# Patient Record
Sex: MALE | Race: WHITE | NOT HISPANIC OR LATINO | URBAN - METROPOLITAN AREA
[De-identification: names, ages, dates, MRNs, and addresses within clinical notes are randomized per-mention and may not be internally consistent; named-entity substitution may affect disease eponyms.]

---

## 2023-03-24 PROBLEM — M76.899 HIP FLEXOR TENDINITIS: Status: ACTIVE | Noted: 2023-03-24

## 2023-03-24 PROBLEM — K50.90 CROHN'S DISEASE (MULTI): Status: ACTIVE | Noted: 2023-03-24

## 2023-04-06 PROBLEM — J03.90 TONSILLITIS: Status: ACTIVE | Noted: 2023-04-06

## 2023-04-06 PROBLEM — J02.9 SORE THROAT: Status: ACTIVE | Noted: 2023-04-06

## 2023-04-07 ENCOUNTER — OFFICE VISIT (OUTPATIENT)
Dept: PRIMARY CARE | Facility: CLINIC | Age: 43
End: 2023-04-07
Payer: COMMERCIAL

## 2023-04-07 ENCOUNTER — LAB (OUTPATIENT)
Dept: LAB | Facility: LAB | Age: 43
End: 2023-04-07
Payer: COMMERCIAL

## 2023-04-07 VITALS
HEART RATE: 58 BPM | BODY MASS INDEX: 23.4 KG/M2 | DIASTOLIC BLOOD PRESSURE: 64 MMHG | WEIGHT: 172.8 LBS | SYSTOLIC BLOOD PRESSURE: 92 MMHG | OXYGEN SATURATION: 100 % | HEIGHT: 72 IN

## 2023-04-07 DIAGNOSIS — Z13.6 ENCOUNTER FOR SCREENING FOR CARDIOVASCULAR DISORDERS: ICD-10-CM

## 2023-04-07 DIAGNOSIS — Z13.0 SCREENING FOR DEFICIENCY ANEMIA: ICD-10-CM

## 2023-04-07 DIAGNOSIS — Z00.00 ROUTINE GENERAL MEDICAL EXAMINATION AT A HEALTH CARE FACILITY: ICD-10-CM

## 2023-04-07 DIAGNOSIS — Z00.00 ROUTINE GENERAL MEDICAL EXAMINATION AT A HEALTH CARE FACILITY: Primary | ICD-10-CM

## 2023-04-07 LAB
ALANINE AMINOTRANSFERASE (SGPT) (U/L) IN SER/PLAS: 26 U/L (ref 10–52)
ALBUMIN (G/DL) IN SER/PLAS: 4.3 G/DL (ref 3.4–5)
ALKALINE PHOSPHATASE (U/L) IN SER/PLAS: 64 U/L (ref 33–120)
ANION GAP IN SER/PLAS: 12 MMOL/L (ref 10–20)
ASPARTATE AMINOTRANSFERASE (SGOT) (U/L) IN SER/PLAS: 22 U/L (ref 9–39)
BILIRUBIN TOTAL (MG/DL) IN SER/PLAS: 0.5 MG/DL (ref 0–1.2)
CALCIUM (MG/DL) IN SER/PLAS: 9.8 MG/DL (ref 8.6–10.6)
CARBON DIOXIDE, TOTAL (MMOL/L) IN SER/PLAS: 32 MMOL/L (ref 21–32)
CHLORIDE (MMOL/L) IN SER/PLAS: 101 MMOL/L (ref 98–107)
CHOLESTEROL (MG/DL) IN SER/PLAS: 143 MG/DL (ref 0–199)
CHOLESTEROL IN HDL (MG/DL) IN SER/PLAS: 48.1 MG/DL
CHOLESTEROL/HDL RATIO: 3
CREATININE (MG/DL) IN SER/PLAS: 1.05 MG/DL (ref 0.5–1.3)
ERYTHROCYTE DISTRIBUTION WIDTH (RATIO) BY AUTOMATED COUNT: 12.2 % (ref 11.5–14.5)
ERYTHROCYTE MEAN CORPUSCULAR HEMOGLOBIN CONCENTRATION (G/DL) BY AUTOMATED: 32.7 G/DL (ref 32–36)
ERYTHROCYTE MEAN CORPUSCULAR VOLUME (FL) BY AUTOMATED COUNT: 95 FL (ref 80–100)
ERYTHROCYTES (10*6/UL) IN BLOOD BY AUTOMATED COUNT: 4.72 X10E12/L (ref 4.5–5.9)
GFR MALE: 90 ML/MIN/1.73M2
GLUCOSE (MG/DL) IN SER/PLAS: 85 MG/DL (ref 74–99)
HEMATOCRIT (%) IN BLOOD BY AUTOMATED COUNT: 44.7 % (ref 41–52)
HEMOGLOBIN (G/DL) IN BLOOD: 14.6 G/DL (ref 13.5–17.5)
LDL: 82 MG/DL (ref 0–99)
LEUKOCYTES (10*3/UL) IN BLOOD BY AUTOMATED COUNT: 4.2 X10E9/L (ref 4.4–11.3)
NRBC (PER 100 WBCS) BY AUTOMATED COUNT: 0 /100 WBC (ref 0–0)
PLATELETS (10*3/UL) IN BLOOD AUTOMATED COUNT: 271 X10E9/L (ref 150–450)
POTASSIUM (MMOL/L) IN SER/PLAS: 4.1 MMOL/L (ref 3.5–5.3)
PROTEIN TOTAL: 6.8 G/DL (ref 6.4–8.2)
SODIUM (MMOL/L) IN SER/PLAS: 141 MMOL/L (ref 136–145)
TRIGLYCERIDE (MG/DL) IN SER/PLAS: 66 MG/DL (ref 0–149)
UREA NITROGEN (MG/DL) IN SER/PLAS: 15 MG/DL (ref 6–23)
VLDL: 13 MG/DL (ref 0–40)

## 2023-04-07 PROCEDURE — 36415 COLL VENOUS BLD VENIPUNCTURE: CPT

## 2023-04-07 PROCEDURE — 99396 PREV VISIT EST AGE 40-64: CPT | Performed by: STUDENT IN AN ORGANIZED HEALTH CARE EDUCATION/TRAINING PROGRAM

## 2023-04-07 PROCEDURE — 85027 COMPLETE CBC AUTOMATED: CPT

## 2023-04-07 PROCEDURE — 80053 COMPREHEN METABOLIC PANEL: CPT

## 2023-04-07 PROCEDURE — 80061 LIPID PANEL: CPT

## 2023-04-07 RX ORDER — HYDROCORTISONE 25 MG/G
CREAM TOPICAL 2 TIMES DAILY
COMMUNITY
Start: 2020-07-29

## 2023-04-07 RX ORDER — TRIAMCINOLONE ACETONIDE 1 MG/G
OINTMENT TOPICAL 2 TIMES DAILY
COMMUNITY
Start: 2020-07-29 | End: 2023-04-07 | Stop reason: ALTCHOICE

## 2023-04-07 RX ORDER — ZOLPIDEM TARTRATE 10 MG/1
1 TABLET ORAL DAILY PRN
COMMUNITY
Start: 2015-10-20 | End: 2023-04-07 | Stop reason: ALTCHOICE

## 2023-04-07 RX ORDER — AMOXICILLIN AND CLAVULANATE POTASSIUM 875; 125 MG/1; MG/1
TABLET, FILM COATED ORAL
COMMUNITY
Start: 2023-01-02 | End: 2023-04-07 | Stop reason: ALTCHOICE

## 2023-04-07 RX ORDER — AMOXICILLIN 500 MG/1
500 CAPSULE ORAL 2 TIMES DAILY
Qty: 20 CAPSULE | Refills: 0 | COMMUNITY
Start: 2023-01-15 | End: 2023-04-07 | Stop reason: ALTCHOICE

## 2023-04-07 RX ORDER — KETOCONAZOLE 20 MG/ML
SHAMPOO, SUSPENSION TOPICAL
COMMUNITY
Start: 2020-07-29 | End: 2023-04-07 | Stop reason: ALTCHOICE

## 2023-04-07 NOTE — PROGRESS NOTES
"Subjective   Patient ID: Paul Almanza is a 42 y.o. male who presents for Annual Exam (He is fasting.).    HPI wellness visit.  No major complaints    Review of Systems  Constitutional: NO F, chills, or sweats  Eyes: no blurred vision or visual disturbance  ENT: no hearing loss, no congestion, no nasal discharge, no hoarseness and no sore throat.   Cardiovascular: no chest pain, no edema, no palps and no syncope.   Respiratory: no cough,no s.o.b. and no wheezing  Gastrointestinal: no abdominal pain, No C/D no N/V, no blood in stools  Genitourinary: no dysuria, no change in urinary frequency, no urinary hesitancy and no feelings of urinary urgency.   Musculoskeletal: no arthralgias,  no back pain and no myalgias.   Integumentary: no new skin lesions and no rashes.   Neurological: no difficulty walking, no headache, no limb weakness, no numbness and no tingling.   Psychiatric: no anxiety, no depression, no anhedonia and no substance use disorders.   Endocrine: no recent weight gain and no recent weight loss.   Hematologic/Lymphatic: no tendency for easy bruising and no swollen glands.  Objective   BP 92/64 (BP Location: Left arm, Patient Position: Sitting, BP Cuff Size: Adult)   Pulse 58   Ht 1.822 m (5' 11.73\")   Wt 78.4 kg (172 lb 12.8 oz)   SpO2 100%   BMI 23.61 kg/m²     Physical Exam  gen- a & o x 3, nad, pleasant  heent- eomi, perrla, ear canals patent, TM's non-erythematous, no fluid, frontal and maxillary sinus's nontender  neck- supple, nontender, no palpable or enlarged nodes, no thyromegaly  heart- rrr, no murmurs  lungs- cta b/l , no w/r/r  chest- symmetric, nontender  ab- soft, nontender, no palpable organomegaly, postive bowel sounds  ex's- no c/c/e  neuro- CNs 2-12 grossly intact, full sensation and strength in all extremities  g/u-na  rectal- na  skin- no atypical rashes or lesions  Assessment/Plan     1.  Wellness visit.  No concerns on exam.  Screening labs ordered today.  Continue healthy " diet and fitness.

## 2023-04-07 NOTE — PATIENT INSTRUCTIONS
1.  Wellness visit.  No concerns on exam.  Screening labs ordered today.  Continue healthy diet and fitness.

## 2024-01-19 ENCOUNTER — OFFICE VISIT (OUTPATIENT)
Dept: ORTHOPEDIC SURGERY | Facility: CLINIC | Age: 44
End: 2024-01-19
Payer: COMMERCIAL

## 2024-01-19 DIAGNOSIS — M24.131 DEGENERATIVE TFCC TEAR, RIGHT: Primary | ICD-10-CM

## 2024-01-19 PROCEDURE — 2500000005 HC RX 250 GENERAL PHARMACY W/O HCPCS: Performed by: ORTHOPAEDIC SURGERY

## 2024-01-19 PROCEDURE — 99213 OFFICE O/P EST LOW 20 MIN: CPT | Performed by: ORTHOPAEDIC SURGERY

## 2024-01-19 PROCEDURE — 2500000004 HC RX 250 GENERAL PHARMACY W/ HCPCS (ALT 636 FOR OP/ED): Performed by: ORTHOPAEDIC SURGERY

## 2024-01-19 PROCEDURE — 99203 OFFICE O/P NEW LOW 30 MIN: CPT | Performed by: ORTHOPAEDIC SURGERY

## 2024-01-19 PROCEDURE — 20605 DRAIN/INJ JOINT/BURSA W/O US: CPT | Performed by: ORTHOPAEDIC SURGERY

## 2024-01-19 PROCEDURE — 1036F TOBACCO NON-USER: CPT | Performed by: ORTHOPAEDIC SURGERY

## 2024-01-19 RX ADMIN — LIDOCAINE HYDROCHLORIDE 0.75 ML: 10 INJECTION, SOLUTION INFILTRATION; PERINEURAL at 12:16

## 2024-01-19 RX ADMIN — TRIAMCINOLONE ACETONIDE 30 MG: 40 INJECTION, SUSPENSION INTRA-ARTICULAR; INTRAMUSCULAR at 12:16

## 2024-01-19 NOTE — LETTER
February 10, 2024     Rick Dey DO  [address]  [address]  [facility] [address]    Patient: Paul Almanza   YOB: 1980   Date of Visit: 1/19/2024       Dear Dr. Rick Dey DO:    Thank you for referring Paul Almanza to me for evaluation. Below are my notes for this consultation.  If you have questions, please do not hesitate to call me. I look forward to following your patient along with you.       Sincerely,     Ray Tracy MD      CC: No Recipients  ______________________________________________________________________________________    CHIEF COMPLAINT         Right wrist pain    ASSESSMENT + PLAN    Right wrist degenerative TFCC tear    I reviewed the nature of a degenerative TFCC tear, and discussed the typical self-limited clinical course.  I discussed my recommendation for use of ice or heat and anti-inflammatories or Tylenol as needed for any discomfort.  A wrist splint, such as a carpal tunnel splint, can help reduce the discomfort but will not completely eliminate it.  Only time can do that.    They may advance activity out of the splint as pain allows.  There are no particular restrictions.    The patient felt that they had already maximized the benefit of conservative care, and wanted to proceed with an injection .  I reviewed the risks of cortisone injection, including infection, hypopigmentation, and fat atrophy.  The transient cortisone effect on blood glucose measurement was also reviewed, and the patient wished to proceed.    After sterile prep, I injected 30 mg of Kenalog and 1 cc of 1% lidocaine, plain to the dorsum of the right wrist through the 3-4 interval.    I discussed the small possibility of needing a surgical wrist scope procedure down the road to clean out the wrist.  Most of these will get better on their own.    Follow-up with any concerns.        HISTORY OF PRESENT ILLNESS       Patient is a 43 y.o. right-hand dominant male , who  presents today for evaluation of right wrist pain.  This began December 26 while he was cleaning his house.  There was no single specific recalled direct trauma to that hand.  Pain began gradually.  No numbness or tingling.  Pain is sharp and in the ulnar wrist.  It is worse with flexion or extension and especially with ulnar deviation and twist, such as using a screwdriver.  No similar problem on the left.  No popping, clicking, or instability.  He has tried Tylenol for this.  Ibuprofen helps but he uses it sparingly given his Crohn's disease.  He was using a compression strap and the symptoms improved.  He discontinued the splint and symptoms recurred.  He is now using the strap again but it is not helping as much.  This does not wake him from sleep.    He is not diabetic or hypothyroid.  He does not smoke.      REVIEW OF SYSTEMS       A 30-item multi-system Review Of Systems was obtained on today's intake form.  This was reviewed with the patient and is correct.  The pertinent positives and negatives are listed above.  The form has been scanned separately into the medical record.      PHYSICAL EXAM    Constitutional:    Appears stated age. Well-developed and well-nourished male in no acute distress.  Psychiatric:         Pleasant normal mood and affect. Behavior is appropriate for the situation.   Head:                   Normocephalic and atraumatic.  Eyes:                    Pupils are equal and round.  Cardiovascular:  2+ radial and ulnar pulses. Fingers well-perfused.  Respiratory:        Effort normal. No respiratory distress. Speaking in complete sentences.  Neurologic:       Alert and oriented to person, place, and time.  Skin:                Skin is intact, warm and dry.  Hematologic / Lymphatic:    No lymphedema or lymphangitis.    Extremities / Musculoskeletal:                      Skin of the right hand and wrist is intact with no erythema, ecchymosis, or diffuse swelling.  Normal skin drag and  coloration.  Full composite finger flexion extension with full intrinsic plus minus posture.  Good sagittal plane balance.  Negative Lira.  Negative midcarpal shift.  Negative PT compression and LT shear tests.  DRUJ stable in all stations.  Negative DRUJ grind.  Positive TFCC grind with appropriate reversal.  Wrist flexion just 15 degrees without discomfort in pronation.  Full extension.  Tender in the fovea.  No tenderness at the ECU.  Negative Synergy test.  Tendon stable in the groove by FUSS test.      IMAGING / LABS / EMGs           None today      No past medical history on file.    Medication Documentation Review Audit       Reviewed by Anushka Plata CMA (Medical Assistant) on 04/07/23 at 0829      Medication Order Taking? Sig Documenting Provider Last Dose Status     Discontinued 04/07/23 0828   amoxicillin-pot clavulanate (Augmentin) 875-125 mg tablet [id number] No TAKE 1 TABLET EVERY 12 HOURS UNTIL GONE Historical Provider, MD Not Taking Active   hydrocortisone 2.5 % cream [id number] No Apply topically twice a day. Historical Provider, MD Not Taking Active   ketoconazole (NIZOral) 2 % shampoo [id number] No Apply to face, let sit for a few minutes, then rinse off. Historical Provider, MD Not Taking Active   triamcinolone (Kenalog) 0.1 % ointment [id number] No Apply topically twice a day. Historical Provider, MD Not Taking Active   zolpidem (Ambien) 10 mg tablet [id number] No Take 1 tablet (10 mg) by mouth once daily as needed. Historical Provider, MD Not Taking Active                    No Known Allergies    Social History     Socioeconomic History   • Marital status:      Spouse name: Not on file   • Number of children: Not on file   • Years of education: Not on file   • Highest education level: Not on file   Occupational History   • Not on file   Tobacco Use   • Smoking status: Not on file   • Smokeless tobacco: Not on file   Substance and Sexual Activity   • Alcohol use: Not on file   • Drug use: Not  on file   • Sexual activity: Not on file   Other Topics Concern   • Not on file   Social History Narrative   • Not on file     Social Determinants of Health     Financial Resource Strain: Not on file   Food Insecurity: Not on file   Transportation Needs: Not on file   Physical Activity: Not on file   Stress: Not on file   Social Connections: Not on file   Intimate Partner Violence: Not on file   Housing Stability: Not on file       Past Surgical History:   Procedure Laterality Date   • OTHER SURGICAL HISTORY  09/24/2021    Appendectomy   • OTHER SURGICAL HISTORY  09/24/2021    Small bowel resection       PROCEDURE    M Inj/Asp: R radiocarpal on 1/19/2024 12:16 PM  Details: 25 G needle, dorsal approach  Medications: 30 mg triamcinolone acetonide 40 mg/mL; 0.75 mL lidocaine 10 mg/mL (1 %)  Outcome: tolerated well, no immediate complications  Procedure, treatment alternatives, risks and benefits explained, specific risks discussed. Consent was given by the patient.           Electronically Signed      JOSE Tracy MD      Orthopaedic Hand Surgery      [phone]

## 2024-01-19 NOTE — PROGRESS NOTES
CHIEF COMPLAINT         Right wrist pain    ASSESSMENT + PLAN    Right wrist degenerative TFCC tear    I reviewed the nature of a degenerative TFCC tear, and discussed the typical self-limited clinical course.  I discussed my recommendation for use of ice or heat and anti-inflammatories or Tylenol as needed for any discomfort.  A wrist splint, such as a carpal tunnel splint, can help reduce the discomfort but will not completely eliminate it.  Only time can do that.    They may advance activity out of the splint as pain allows.  There are no particular restrictions.    The patient felt that they had already maximized the benefit of conservative care, and wanted to proceed with an injection .  I reviewed the risks of cortisone injection, including infection, hypopigmentation, and fat atrophy.  The transient cortisone effect on blood glucose measurement was also reviewed, and the patient wished to proceed.    After sterile prep, I injected 30 mg of Kenalog and 1 cc of 1% lidocaine, plain to the dorsum of the right wrist through the 3-4 interval.    I discussed the small possibility of needing a surgical wrist scope procedure down the road to clean out the wrist.  Most of these will get better on their own.    Follow-up with any concerns.        HISTORY OF PRESENT ILLNESS       Patient is a 43 y.o. right-hand dominant male , who presents today for evaluation of right wrist pain.  This began December 26 while he was cleaning his house.  There was no single specific recalled direct trauma to that hand.  Pain began gradually.  No numbness or tingling.  Pain is sharp and in the ulnar wrist.  It is worse with flexion or extension and especially with ulnar deviation and twist, such as using a screwdriver.  No similar problem on the left.  No popping, clicking, or instability.  He has tried Tylenol for this.  Ibuprofen helps but he uses it sparingly given his Crohn's disease.  He was using a compression strap and the  symptoms improved.  He discontinued the splint and symptoms recurred.  He is now using the strap again but it is not helping as much.  This does not wake him from sleep.    He is not diabetic or hypothyroid.  He does not smoke.      REVIEW OF SYSTEMS       A 30-item multi-system Review Of Systems was obtained on today's intake form.  This was reviewed with the patient and is correct.  The pertinent positives and negatives are listed above.  The form has been scanned separately into the medical record.      PHYSICAL EXAM    Constitutional:    Appears stated age. Well-developed and well-nourished male in no acute distress.  Psychiatric:         Pleasant normal mood and affect. Behavior is appropriate for the situation.   Head:                   Normocephalic and atraumatic.  Eyes:                    Pupils are equal and round.  Cardiovascular:  2+ radial and ulnar pulses. Fingers well-perfused.  Respiratory:        Effort normal. No respiratory distress. Speaking in complete sentences.  Neurologic:       Alert and oriented to person, place, and time.  Skin:                Skin is intact, warm and dry.  Hematologic / Lymphatic:    No lymphedema or lymphangitis.    Extremities / Musculoskeletal:                      Skin of the right hand and wrist is intact with no erythema, ecchymosis, or diffuse swelling.  Normal skin drag and coloration.  Full composite finger flexion extension with full intrinsic plus minus posture.  Good sagittal plane balance.  Negative Lira.  Negative midcarpal shift.  Negative PT compression and LT shear tests.  DRUJ stable in all stations.  Negative DRUJ grind.  Positive TFCC grind with appropriate reversal.  Wrist flexion just 15 degrees without discomfort in pronation.  Full extension.  Tender in the fovea.  No tenderness at the ECU.  Negative Synergy test.  Tendon stable in the groove by FUSS test.      IMAGING / LABS / EMGs           None today      No past medical history on  file.    Medication Documentation Review Audit       Reviewed by Anushka Plata CMA (Medical Assistant) on 04/07/23 at 0829      Medication Order Taking? Sig Documenting Provider Last Dose Status     Discontinued 04/07/23 0828   amoxicillin-pot clavulanate (Augmentin) 875-125 mg tablet [id number] No TAKE 1 TABLET EVERY 12 HOURS UNTIL GONE Historical Provider, MD Not Taking Active   hydrocortisone 2.5 % cream [id number] No Apply topically twice a day. Historical Provider, MD Not Taking Active   ketoconazole (NIZOral) 2 % shampoo [id number] No Apply to face, let sit for a few minutes, then rinse off. Historical Provider, MD Not Taking Active   triamcinolone (Kenalog) 0.1 % ointment [id number] No Apply topically twice a day. Historical Provider, MD Not Taking Active   zolpidem (Ambien) 10 mg tablet [id number] No Take 1 tablet (10 mg) by mouth once daily as needed. Historical Provider, MD Not Taking Active                    No Known Allergies    Social History     Socioeconomic History    Marital status:      Spouse name: Not on file    Number of children: Not on file    Years of education: Not on file    Highest education level: Not on file   Occupational History    Not on file   Tobacco Use    Smoking status: Not on file    Smokeless tobacco: Not on file   Substance and Sexual Activity    Alcohol use: Not on file    Drug use: Not on file    Sexual activity: Not on file   Other Topics Concern    Not on file   Social History Narrative    Not on file     Social Determinants of Health     Financial Resource Strain: Not on file   Food Insecurity: Not on file   Transportation Needs: Not on file   Physical Activity: Not on file   Stress: Not on file   Social Connections: Not on file   Intimate Partner Violence: Not on file   Housing Stability: Not on file       Past Surgical History:   Procedure Laterality Date    OTHER SURGICAL HISTORY  09/24/2021    Appendectomy    OTHER SURGICAL HISTORY  09/24/2021    Small bowel  resection       PROCEDURE    M Inj/Asp: R radiocarpal on 1/19/2024 12:16 PM  Details: 25 G needle, dorsal approach  Medications: 30 mg triamcinolone acetonide 40 mg/mL; 0.75 mL lidocaine 10 mg/mL (1 %)  Outcome: tolerated well, no immediate complications  Procedure, treatment alternatives, risks and benefits explained, specific risks discussed. Consent was given by the patient.           Electronically Signed      JOSE Tracy MD      Orthopaedic Hand Surgery      [phone]

## 2024-02-10 PROBLEM — M24.131 DEGENERATIVE TFCC TEAR, RIGHT: Status: ACTIVE | Noted: 2024-02-10

## 2024-02-10 RX ORDER — LIDOCAINE HYDROCHLORIDE 10 MG/ML
0.75 INJECTION INFILTRATION; PERINEURAL
Status: COMPLETED | OUTPATIENT
Start: 2024-01-19 | End: 2024-01-19

## 2024-02-10 RX ORDER — TRIAMCINOLONE ACETONIDE 40 MG/ML
30 INJECTION, SUSPENSION INTRA-ARTICULAR; INTRAMUSCULAR
Status: COMPLETED | OUTPATIENT
Start: 2024-01-19 | End: 2024-01-19

## 2024-03-13 ENCOUNTER — OFFICE VISIT (OUTPATIENT)
Dept: PRIMARY CARE | Facility: CLINIC | Age: 44
End: 2024-03-13
Payer: COMMERCIAL

## 2024-03-13 VITALS
HEIGHT: 72 IN | WEIGHT: 180 LBS | HEART RATE: 56 BPM | SYSTOLIC BLOOD PRESSURE: 108 MMHG | OXYGEN SATURATION: 98 % | BODY MASS INDEX: 24.38 KG/M2 | DIASTOLIC BLOOD PRESSURE: 72 MMHG

## 2024-03-13 DIAGNOSIS — S03.40XS SPRAIN OF TEMPOROMANDIBULAR JOINT, SEQUELA: Primary | ICD-10-CM

## 2024-03-13 PROBLEM — G89.29 CHRONIC RIGHT-SIDED LOW BACK PAIN WITHOUT SCIATICA: Status: ACTIVE | Noted: 2022-03-08

## 2024-03-13 PROBLEM — M51.36 BULGE OF LUMBAR DISC WITHOUT MYELOPATHY: Status: ACTIVE | Noted: 2022-03-08

## 2024-03-13 PROBLEM — M54.50 CHRONIC RIGHT-SIDED LOW BACK PAIN WITHOUT SCIATICA: Status: ACTIVE | Noted: 2022-03-08

## 2024-03-13 PROBLEM — M79.18 MYOFASCIAL PAIN: Status: ACTIVE | Noted: 2022-03-08

## 2024-03-13 PROCEDURE — 99213 OFFICE O/P EST LOW 20 MIN: CPT | Performed by: STUDENT IN AN ORGANIZED HEALTH CARE EDUCATION/TRAINING PROGRAM

## 2024-03-13 PROCEDURE — 1036F TOBACCO NON-USER: CPT | Performed by: STUDENT IN AN ORGANIZED HEALTH CARE EDUCATION/TRAINING PROGRAM

## 2024-03-13 ASSESSMENT — PAIN SCALES - GENERAL: PAINLEVEL: 4

## 2024-03-13 ASSESSMENT — ENCOUNTER SYMPTOMS: DEPRESSION: 0

## 2024-03-13 NOTE — PROGRESS NOTES
"Subjective   Patient ID: Paul Almanza is a 43 y.o. male who presents for Jaw Pain.    HPI right-sided jaw pain x 2 weeks he did have some trauma playing basketball    Review of Systems  Constitutional: NO F, chills, or sweats  Eyes: no blurred vision or visual disturbance  ENT: no hearing loss, no congestion, no nasal discharge, no hoarseness and no sore throat.   Cardiovascular: no chest pain, no edema, no palps and no syncope.   Respiratory: no cough,no s.o.b. and no wheezing  Gastrointestinal: no abdominal pain, No C/D no N/V, no blood in stools  Genitourinary: no dysuria, no change in urinary frequency, no urinary hesitancy and no feelings of urinary urgency.   Musculoskeletal: Right trauma.  Objective   /72 (BP Location: Right arm, Patient Position: Sitting, BP Cuff Size: Adult)   Pulse 56   Ht 1.822 m (5' 11.73\")   Wt 81.6 kg (180 lb)   SpO2 98%   BMI 24.60 kg/m²     Physical Exam  gen- a & o x 3, nad, pleasant  heent- eomi, perrla, ear canals patent, some tenderness at the right TMJ  Assessment/Plan     1.  Right-sided jaw pain consistent with TMJ sprain.  Conservative management as per handout     "